# Patient Record
Sex: MALE | Race: WHITE | NOT HISPANIC OR LATINO
[De-identification: names, ages, dates, MRNs, and addresses within clinical notes are randomized per-mention and may not be internally consistent; named-entity substitution may affect disease eponyms.]

---

## 2021-09-27 ENCOUNTER — APPOINTMENT (OUTPATIENT)
Dept: ORTHOPEDIC SURGERY | Facility: CLINIC | Age: 66
End: 2021-09-27
Payer: COMMERCIAL

## 2021-09-27 DIAGNOSIS — M17.0 BILATERAL PRIMARY OSTEOARTHRITIS OF KNEE: ICD-10-CM

## 2021-09-27 PROBLEM — Z00.00 ENCOUNTER FOR PREVENTIVE HEALTH EXAMINATION: Status: ACTIVE | Noted: 2021-09-27

## 2021-09-27 PROCEDURE — 99204 OFFICE O/P NEW MOD 45 MIN: CPT

## 2021-09-27 PROCEDURE — 73562 X-RAY EXAM OF KNEE 3: CPT | Mod: 50

## 2021-09-27 NOTE — PHYSICAL EXAM
[de-identified] : Right knee shows no warmth there is some swelling there is patellofemoral crepitus and lateral patella tenderness. Negative [facility] neurovascular exam is normal without instability\par \par Left knee shows no warmth swelling with crepitus on range of motion no instability neurovascular exam is normal. Negative [facility]

## 2021-09-27 NOTE — REASON FOR VISIT
[Initial Visit] : an initial visit for [Knee Pain] : knee pain [FreeTextEntry2] : pacheco knees right hurts more than left

## 2021-09-27 NOTE — HISTORY OF PRESENT ILLNESS
[de-identified] : 65-year-old male with right greater than left with chronic knee pain. His osteoarthritis or many years recent patellar dislocation in the past of his right knee. He had a fall about 6 weeks ago. Some swelling. It is getting better the pain is very mild. No buckling or locking.

## 2021-09-27 NOTE — DISCUSSION/SUMMARY
[Medication Risks Reviewed] : Medication risks reviewed [de-identified] : Options for treatment have been discussed. The pain is getting better he will ice. Use over-the-counter medication discussion of cortisone was given but I don't recommend it at this point. If his symptoms don't improve he'll let me know. He is exercising regularly which is a good thing.